# Patient Record
Sex: FEMALE | Race: ASIAN | NOT HISPANIC OR LATINO
[De-identification: names, ages, dates, MRNs, and addresses within clinical notes are randomized per-mention and may not be internally consistent; named-entity substitution may affect disease eponyms.]

---

## 2021-01-05 PROBLEM — Z00.00 ENCOUNTER FOR PREVENTIVE HEALTH EXAMINATION: Status: ACTIVE | Noted: 2021-01-05

## 2021-01-19 ENCOUNTER — APPOINTMENT (OUTPATIENT)
Dept: OTOLARYNGOLOGY | Facility: CLINIC | Age: 36
End: 2021-01-19
Payer: COMMERCIAL

## 2021-01-19 VITALS
WEIGHT: 120 LBS | DIASTOLIC BLOOD PRESSURE: 78 MMHG | BODY MASS INDEX: 21.26 KG/M2 | HEART RATE: 80 BPM | SYSTOLIC BLOOD PRESSURE: 117 MMHG | HEIGHT: 63 IN | OXYGEN SATURATION: 100 %

## 2021-01-19 DIAGNOSIS — Z78.9 OTHER SPECIFIED HEALTH STATUS: ICD-10-CM

## 2021-01-19 PROCEDURE — 99072 ADDL SUPL MATRL&STAF TM PHE: CPT

## 2021-01-19 PROCEDURE — 31575 DIAGNOSTIC LARYNGOSCOPY: CPT

## 2021-01-19 PROCEDURE — 99204 OFFICE O/P NEW MOD 45 MIN: CPT | Mod: 25

## 2021-01-19 NOTE — HISTORY OF PRESENT ILLNESS
Detail Level: Zone
[de-identified] : This is a patient referred by ENT Dr. Jazz Long for thyroid cancer. Nodules were found  2 years ago during physical exam/US by her PCP. US 11/29/20 showed interval enlargement of L-side nodule now measuring 0.9cm \par FNA L thyrid nodule 12/20/20 was positive for malignancy- papillary thyroid carcinoma. \par FNA L Level II lymph node showed no evidence of metastatic malignancy. \par \par No dysphagia, dysphonia or dyspnea. No throat/neck pain, fever, chills, weight loss, weakness, palpitations. \par Patient denies h/o radiation and family h/o thyroid cancer is unknown. \par

## 2021-01-19 NOTE — CONSULT LETTER
[Dear  ___] : Dear  [unfilled], [Consult Letter:] : I had the pleasure of evaluating your patient, [unfilled]. [Please see my note below.] : Please see my note below. [Consult Closing:] : Thank you very much for allowing me to participate in the care of this patient.  If you have any questions, please do not hesitate to contact me. [Sincerely,] : Sincerely, [FreeTextEntry2] : Dr Guillermo Long [FreeTextEntry3] : \par Ap Montiel MD, FACS\par \par Otolaryngology-Head and Neck Surgery\par [employer] [facility]\par

## 2021-01-28 ENCOUNTER — RESULT REVIEW (OUTPATIENT)
Age: 36
End: 2021-01-28

## 2021-03-10 ENCOUNTER — OUTPATIENT (OUTPATIENT)
Dept: OUTPATIENT SERVICES | Facility: HOSPITAL | Age: 36
LOS: 1 days | End: 2021-03-10

## 2021-03-10 VITALS
HEIGHT: 63 IN | SYSTOLIC BLOOD PRESSURE: 100 MMHG | HEART RATE: 70 BPM | TEMPERATURE: 98 F | OXYGEN SATURATION: 99 % | DIASTOLIC BLOOD PRESSURE: 59 MMHG | WEIGHT: 117.95 LBS

## 2021-03-10 DIAGNOSIS — C73 MALIGNANT NEOPLASM OF THYROID GLAND: ICD-10-CM

## 2021-03-10 LAB
ALBUMIN SERPL ELPH-MCNC: 4.4 G/DL — SIGNIFICANT CHANGE UP (ref 3.3–5)
ALP SERPL-CCNC: 49 U/L — SIGNIFICANT CHANGE UP (ref 40–120)
ALT FLD-CCNC: 12 U/L — SIGNIFICANT CHANGE UP (ref 4–33)
ANION GAP SERPL CALC-SCNC: 12 MMOL/L — SIGNIFICANT CHANGE UP (ref 7–14)
AST SERPL-CCNC: 16 U/L — SIGNIFICANT CHANGE UP (ref 4–32)
BILIRUB SERPL-MCNC: 0.2 MG/DL — SIGNIFICANT CHANGE UP (ref 0.2–1.2)
BUN SERPL-MCNC: 14 MG/DL — SIGNIFICANT CHANGE UP (ref 7–23)
CALCIUM SERPL-MCNC: 9.4 MG/DL — SIGNIFICANT CHANGE UP (ref 8.4–10.5)
CHLORIDE SERPL-SCNC: 102 MMOL/L — SIGNIFICANT CHANGE UP (ref 98–107)
CO2 SERPL-SCNC: 25 MMOL/L — SIGNIFICANT CHANGE UP (ref 22–31)
CREAT SERPL-MCNC: 0.51 MG/DL — SIGNIFICANT CHANGE UP (ref 0.5–1.3)
GLUCOSE SERPL-MCNC: 105 MG/DL — HIGH (ref 70–99)
HCG UR QL: NEGATIVE — SIGNIFICANT CHANGE UP
HCT VFR BLD CALC: 35.2 % — SIGNIFICANT CHANGE UP (ref 34.5–45)
HGB BLD-MCNC: 11.5 G/DL — SIGNIFICANT CHANGE UP (ref 11.5–15.5)
MCHC RBC-ENTMCNC: 28 PG — SIGNIFICANT CHANGE UP (ref 27–34)
MCHC RBC-ENTMCNC: 32.7 GM/DL — SIGNIFICANT CHANGE UP (ref 32–36)
MCV RBC AUTO: 85.6 FL — SIGNIFICANT CHANGE UP (ref 80–100)
NRBC # BLD: 0 /100 WBCS — SIGNIFICANT CHANGE UP
NRBC # FLD: 0 K/UL — SIGNIFICANT CHANGE UP
PLATELET # BLD AUTO: 180 K/UL — SIGNIFICANT CHANGE UP (ref 150–400)
POTASSIUM SERPL-MCNC: 3.7 MMOL/L — SIGNIFICANT CHANGE UP (ref 3.5–5.3)
POTASSIUM SERPL-SCNC: 3.7 MMOL/L — SIGNIFICANT CHANGE UP (ref 3.5–5.3)
PROT SERPL-MCNC: 7.2 G/DL — SIGNIFICANT CHANGE UP (ref 6–8.3)
RBC # BLD: 4.11 M/UL — SIGNIFICANT CHANGE UP (ref 3.8–5.2)
RBC # FLD: 12 % — SIGNIFICANT CHANGE UP (ref 10.3–14.5)
SODIUM SERPL-SCNC: 139 MMOL/L — SIGNIFICANT CHANGE UP (ref 135–145)
WBC # BLD: 4.95 K/UL — SIGNIFICANT CHANGE UP (ref 3.8–10.5)
WBC # FLD AUTO: 4.95 K/UL — SIGNIFICANT CHANGE UP (ref 3.8–10.5)

## 2021-03-10 RX ORDER — SODIUM CHLORIDE 9 MG/ML
1000 INJECTION, SOLUTION INTRAVENOUS
Refills: 0 | Status: DISCONTINUED | OUTPATIENT
Start: 2021-03-17 | End: 2021-03-31

## 2021-03-10 RX ORDER — SODIUM CHLORIDE 9 MG/ML
3 INJECTION INTRAMUSCULAR; INTRAVENOUS; SUBCUTANEOUS EVERY 8 HOURS
Refills: 0 | Status: DISCONTINUED | OUTPATIENT
Start: 2021-03-17 | End: 2021-03-31

## 2021-03-10 NOTE — H&P PST ADULT - NSANTHOSAYNRD_GEN_A_CORE
No. ZENA screening performed.  STOP BANG Legend: 0-2 = LOW Risk; 3-4 = INTERMEDIATE Risk; 5-8 = HIGH Risk

## 2021-03-10 NOTE — H&P PST ADULT - NEGATIVE OPHTHALMOLOGIC SYMPTOMS
no diplopia/no lacrimation L/no lacrimation R/no blurred vision L/no blurred vision R/no discharge L/no discharge R/no pain L/no irritation L/no irritation R/no loss of vision L/no loss of vision R/no scleral injection L/no scleral injection R no diplopia/no photophobia/no lacrimation L/no lacrimation R/no blurred vision L/no blurred vision R/no discharge L/no discharge R/no pain L/no pain R/no irritation L/no irritation R/no loss of vision L/no loss of vision R/no scleral injection L/no scleral injection R

## 2021-03-10 NOTE — H&P PST ADULT - NEGATIVE ENMT SYMPTOMS
no hearing difficulty/no ear pain/no tinnitus/no vertigo/no nasal congestion/no nasal discharge/no nasal obstruction/no post-nasal discharge/no nose bleeds/no abnormal taste sensation no hearing difficulty/no ear pain/no tinnitus/no vertigo/no sinus symptoms/no nasal congestion/no nasal discharge/no nasal obstruction/no post-nasal discharge/no nose bleeds/no abnormal taste sensation

## 2021-03-10 NOTE — H&P PST ADULT - HISTORY OF PRESENT ILLNESS
34 y/o female presents to PST  left thyroid nodule dx in 2018. S/P US of thyroid on 12/2020 showed that nodule got bigger and irregular. S/P thyroid biopsy 12/20/2020. Pre op diagnosis 36 y/o female presents to PST for pre op evaluation with hx of left thyroid nodule dx in 2018. S/P US of thyroid on 12/2020 which showed that nodule got bigger and irregular. S/P thyroid biopsy 12/20/2020. Pre op diagnosis: malignant neoplasm of thyroid gland. Scheduled for left thyroid lobectomy on 03/17/2021

## 2021-03-10 NOTE — H&P PST ADULT - NSICDXPROBLEM_GEN_ALL_CORE_FT
PROBLEM DIAGNOSES  Problem: Malignant neoplasm of thyroid gland  Assessment and Plan: Scheduled for left thyroid lobectomy on 03/17/2021. Pre op instructions, famotidine, chlorhexidine gluconate soap given and explained. Pt verbalized understanding.  Pt confirmed scheduled appt. for Covid test pre op  Pt instructed to bring urine specimen on day of surgery, urine cup given to pt who verbalized understanding.

## 2021-03-11 ENCOUNTER — TRANSCRIPTION ENCOUNTER (OUTPATIENT)
Age: 36
End: 2021-03-11

## 2021-03-12 DIAGNOSIS — Z01.818 ENCOUNTER FOR OTHER PREPROCEDURAL EXAMINATION: ICD-10-CM

## 2021-03-14 ENCOUNTER — APPOINTMENT (OUTPATIENT)
Dept: DISASTER EMERGENCY | Facility: CLINIC | Age: 36
End: 2021-03-14

## 2021-03-15 LAB — SARS-COV-2 N GENE NPH QL NAA+PROBE: NOT DETECTED

## 2021-03-16 ENCOUNTER — TRANSCRIPTION ENCOUNTER (OUTPATIENT)
Age: 36
End: 2021-03-16

## 2021-03-17 ENCOUNTER — OUTPATIENT (OUTPATIENT)
Dept: OUTPATIENT SERVICES | Facility: HOSPITAL | Age: 36
LOS: 1 days | Discharge: HOME | End: 2021-03-17
Payer: MEDICAID

## 2021-03-17 ENCOUNTER — RESULT REVIEW (OUTPATIENT)
Age: 36
End: 2021-03-17

## 2021-03-17 ENCOUNTER — APPOINTMENT (OUTPATIENT)
Dept: OTOLARYNGOLOGY | Facility: HOSPITAL | Age: 36
End: 2021-03-17

## 2021-03-17 VITALS
SYSTOLIC BLOOD PRESSURE: 105 MMHG | DIASTOLIC BLOOD PRESSURE: 68 MMHG | OXYGEN SATURATION: 97 % | RESPIRATION RATE: 13 BRPM | HEART RATE: 82 BPM

## 2021-03-17 VITALS
TEMPERATURE: 99 F | RESPIRATION RATE: 14 BRPM | DIASTOLIC BLOOD PRESSURE: 61 MMHG | SYSTOLIC BLOOD PRESSURE: 99 MMHG | WEIGHT: 117.95 LBS | HEIGHT: 63 IN | OXYGEN SATURATION: 98 % | HEART RATE: 83 BPM

## 2021-03-17 DIAGNOSIS — C73 MALIGNANT NEOPLASM OF THYROID GLAND: ICD-10-CM

## 2021-03-17 LAB — HCG UR QL: NEGATIVE — SIGNIFICANT CHANGE UP

## 2021-03-17 PROCEDURE — 60220 PARTIAL REMOVAL OF THYROID: CPT

## 2021-03-17 PROCEDURE — 88307 TISSUE EXAM BY PATHOLOGIST: CPT | Mod: 26

## 2021-03-17 PROCEDURE — 88305 TISSUE EXAM BY PATHOLOGIST: CPT | Mod: 26

## 2021-03-17 PROCEDURE — 88331 PATH CONSLTJ SURG 1 BLK 1SPC: CPT | Mod: 26

## 2021-03-17 RX ORDER — HYDROMORPHONE HYDROCHLORIDE 2 MG/ML
0.5 INJECTION INTRAMUSCULAR; INTRAVENOUS; SUBCUTANEOUS
Refills: 0 | Status: DISCONTINUED | OUTPATIENT
Start: 2021-03-17 | End: 2021-03-17

## 2021-03-17 RX ORDER — FENTANYL CITRATE 50 UG/ML
25 INJECTION INTRAVENOUS
Refills: 0 | Status: DISCONTINUED | OUTPATIENT
Start: 2021-03-17 | End: 2021-03-17

## 2021-03-17 RX ORDER — ONDANSETRON 8 MG/1
4 TABLET, FILM COATED ORAL ONCE
Refills: 0 | Status: DISCONTINUED | OUTPATIENT
Start: 2021-03-17 | End: 2021-03-31

## 2021-03-17 RX ORDER — CHOLECALCIFEROL (VITAMIN D3) 125 MCG
0 CAPSULE ORAL
Qty: 0 | Refills: 0 | DISCHARGE

## 2021-03-17 RX ORDER — FOLIC ACID/VIT B COMPLEX AND C 400 MCG
0 TABLET ORAL
Qty: 0 | Refills: 0 | DISCHARGE

## 2021-03-17 NOTE — ASU DISCHARGE PLAN (ADULT/PEDIATRIC) - CALL YOUR DOCTOR IF YOU HAVE ANY OF THE FOLLOWING:
Bleeding that does not stop/Swelling that gets worse/Pain not relieved by Medications/Inability to tolerate liquids or foods Bleeding that does not stop/Swelling that gets worse/Pain not relieved by Medications/Fever greater than (need to indicate Fahrenheit or Celsius)/Wound/Surgical Site with redness, or foul smelling discharge or pus/Nausea and vomiting that does not stop/Unable to urinate/Inability to tolerate liquids or foods

## 2021-03-17 NOTE — ASU DISCHARGE PLAN (ADULT/PEDIATRIC) - CARE PROVIDER_API CALL
Ap Montiel)  Otolaryngology  [address] [place] [address]  Phone: [phone]  Fax: [phone]  Follow Up Time:

## 2021-03-18 PROBLEM — C73 MALIGNANT NEOPLASM OF THYROID GLAND: Chronic | Status: ACTIVE | Noted: 2021-03-10

## 2021-03-19 LAB — SURGICAL PATHOLOGY STUDY: SIGNIFICANT CHANGE UP

## 2021-04-01 ENCOUNTER — APPOINTMENT (OUTPATIENT)
Dept: OTOLARYNGOLOGY | Facility: CLINIC | Age: 36
End: 2021-04-01
Payer: COMMERCIAL

## 2021-04-01 VITALS
BODY MASS INDEX: 21.26 KG/M2 | HEIGHT: 63 IN | DIASTOLIC BLOOD PRESSURE: 76 MMHG | HEART RATE: 102 BPM | SYSTOLIC BLOOD PRESSURE: 119 MMHG | WEIGHT: 120 LBS

## 2021-04-01 PROCEDURE — 99024 POSTOP FOLLOW-UP VISIT: CPT

## 2021-04-01 NOTE — REASON FOR VISIT
[Post-Operative Visit] : a post-operative visit [FreeTextEntry2] : Left thyroid lobectomy and isthmusectomy.\par Left thyroid lobectomy and isthmusectomy.\par Left thyroid lobectomy and isthmusectomy.\par Left thyroid lobectomy and isthmusectomy.\par Left thyroid lobectomy and isthmusectomy.\par Left thyroid lobectomy and isthmusectomy.\par s/p left thyroid lobectomy and isthmusectomy 3/17/2021

## 2021-04-01 NOTE — HISTORY OF PRESENT ILLNESS
[de-identified] : This is a patient here for f/up s/p left thyroid lobectomy and isthmusectomy 3/17/2021. \par Pt was referred by ENT Dr. Jazz Long for thyroid cancer.  US 11/29/20 showed interval enlargement of L-side nodule now measuring 0.9cm  FNA L thyroid nodule 12/20/20 was positive for malignancy- papillary thyroid carcinoma. \par FNA L Level II lymph node showed no evidence of metastatic malignancy. \par Today pt denies throat/neck pain, dysphagia, dyspnea, dysphonia. No fever, chills. Eating and drinking without issues\par \par surgical path 3/17/2021: Final Diagnosis\par \par 1. Left paratracheal tissue r/o lymph node, excision\par - 1 lymph nodes negative for metastatic carcinoma\par \par 2. Thyroid, left lobe, lobectomy\par - Papillary thyroid microcarcinoma, see synoptic summary\par - 3 out of 5 lymph nodes positive for metastatic carcinoma\par - Lymphocytic thyroiditis with Hurthle cell change\par \par 3. Lymph nodes, left paratracheal, dissection\par - 3 lymph nodes negative for metastatic carcinoma

## 2021-04-01 NOTE — PHYSICAL EXAM
[Midline] : trachea located in midline position [Normal] : no rashes [de-identified] : Incision C/D/I.

## 2021-04-01 NOTE — CONSULT LETTER
[Dear  ___] : Dear  [unfilled], [Courtesy Letter:] : I had the pleasure of seeing your patient, [unfilled], in my office today. [Please see my note below.] : Please see my note below. [Consult Closing:] : Thank you very much for allowing me to participate in the care of this patient.  If you have any questions, please do not hesitate to contact me. [Sincerely,] : Sincerely, [FreeTextEntry2] : Dr Guillermo Long [FreeTextEntry3] : \par Ap Montiel MD, FACS\par \par Otolaryngology-Head and Neck Surgery\par [employer] [facility]\par

## 2021-04-06 ENCOUNTER — APPOINTMENT (OUTPATIENT)
Dept: ENDOCRINOLOGY | Facility: CLINIC | Age: 36
End: 2021-04-06
Payer: COMMERCIAL

## 2021-04-06 PROCEDURE — 99205 OFFICE O/P NEW HI 60 MIN: CPT | Mod: 95

## 2021-04-08 NOTE — ASSESSMENT
[FreeTextEntry1] : This is a 35-year-old female with papillary thyroid carcinoma.  She underwent thyroid ultrasound on November 29, 2020 and was found to have interval enlargement of left sided thyroid nodule measuring 0.9 cm with ill-defined borders.\par FNA on December 20, 2020 showed papillary thyroid carcinoma ([place] category VI).  Left level IIA 2.4 cm lymph node showed no evidence of malignancy.\par Internal review of slides confirmed diagnosis.\par She underwent left thyroid lobectomy on March 17, 2021.  Pathology showed left papillary thyroid microcarcinoma 1 x 0.6 x 0.5 cm, 3 out of 9 lymph nodes positive for metastatic carcinoma, lymphocytic thyroiditis.  Largest lymph node deposit 1 mm.  Margins uninvolved by carcinoma, no angioinvasion, no lymphatic invasion, no perineural invasion, no extrathyroidal extension.\par [id number]\par Stage I, low risk.  Will monitor with thyroid ultrasound and thyroglobulin levels once she undergoes completion thyroidectomy.\par She is planning on undergoing completion thyroidectomy sometime in May per patient.\par Check TFTs.\par Check 25 vitamin D.\par

## 2021-04-08 NOTE — REASON FOR VISIT
[Home] : at home, [unfilled] , at the time of the visit. [Medical Office: ([facility])___] : at the medical office located in  [Verbal consent obtained from patient] : the patient, [unfilled] [Consultation] : a consultation visit [Thyroid Cancer] : thyroid cancer [FreeTextEntry2] : Dr. Montiel

## 2021-04-08 NOTE — CONSULT LETTER
[Dear  ___] : Dear  [unfilled], [Consult Letter:] : I had the pleasure of evaluating your patient, [unfilled]. [Please see my note below.] : Please see my note below. [Consult Closing:] : Thank you very much for allowing me to participate in the care of this patient.  If you have any questions, please do not hesitate to contact me. [Sincerely,] : Sincerely, [FreeTextEntry3] : Magaly Alejo MD, FACE\par

## 2021-04-17 ENCOUNTER — NON-APPOINTMENT (OUTPATIENT)
Age: 36
End: 2021-04-17

## 2021-04-17 LAB
25(OH)D3 SERPL-MCNC: 20.9 NG/ML
T4 FREE SERPL-MCNC: 0.9 NG/DL
TSH SERPL-ACNC: 7.85 UIU/ML

## 2021-04-18 LAB
THYROGLOB AB SERPL-ACNC: 566 IU/ML
THYROPEROXIDASE AB SERPL IA-ACNC: 31 IU/ML

## 2021-05-06 ENCOUNTER — APPOINTMENT (OUTPATIENT)
Dept: OTOLARYNGOLOGY | Facility: CLINIC | Age: 36
End: 2021-05-06
Payer: COMMERCIAL

## 2021-05-06 VITALS
SYSTOLIC BLOOD PRESSURE: 96 MMHG | HEIGHT: 63 IN | DIASTOLIC BLOOD PRESSURE: 59 MMHG | HEART RATE: 80 BPM | BODY MASS INDEX: 21.26 KG/M2 | WEIGHT: 120 LBS

## 2021-05-06 PROCEDURE — 99024 POSTOP FOLLOW-UP VISIT: CPT

## 2021-05-06 RX ORDER — VITAMIN E ACID SUCCINATE 268 MG
TABLET ORAL
Refills: 0 | Status: COMPLETED | COMMUNITY
End: 2021-05-06

## 2021-05-06 RX ORDER — CHROMIUM 200 MCG
TABLET ORAL
Refills: 0 | Status: COMPLETED | COMMUNITY
End: 2021-05-06

## 2021-05-06 RX ORDER — ASCORBIC ACID 500 MG
TABLET ORAL
Refills: 0 | Status: COMPLETED | COMMUNITY
End: 2021-05-06

## 2021-05-06 RX ORDER — VITAMIN A 10000 UNIT
TABLET ORAL
Refills: 0 | Status: COMPLETED | COMMUNITY
End: 2021-05-06

## 2021-05-06 NOTE — CONSULT LETTER
[Dear  ___] : Dear  [unfilled], [Courtesy Letter:] : I had the pleasure of seeing your patient, [unfilled], in my office today. [Please see my note below.] : Please see my note below. [Consult Closing:] : Thank you very much for allowing me to participate in the care of this patient.  If you have any questions, please do not hesitate to contact me. [Sincerely,] : Sincerely, [FreeTextEntry2] : Dr Guillermo Long [FreeTextEntry3] : \par Ap Montiel MD, FACS\par \par Otolaryngology-Head and Neck Surgery\par [employer] [facility]\par

## 2021-05-06 NOTE — PHYSICAL EXAM
[Midline] : trachea located in midline position [Normal] : no rashes [de-identified] : Incision C/D/I.

## 2021-05-06 NOTE — HISTORY OF PRESENT ILLNESS
[de-identified] : This is a patient here for f/up s/p left thyroid lobectomy and isthmusectomy 3/17/2021. \par Pt was referred by ENT Dr. Jazz Long for thyroid cancer.  US 11/29/20 showed interval enlargement of L-side nodule measuring 0.9cm  FNA L thyroid nodule 12/20/20 was positive for malignancy- papillary thyroid carcinoma. \par FNA L Level II lymph node showed no evidence of metastatic malignancy.  Followed by Dr. Danielle, last labs 4/16/21. \par Pt c/o slight tightness in neck area, otherwise she feels well.  Pt denies dysphonia, dysphagia, pain, SOB, otalgia.\par \par \par \par surgical path 3/17/2021: Final Diagnosis\par \par 1. Left paratracheal tissue r/o lymph node, excision\par - 1 lymph nodes negative for metastatic carcinoma\par \par 2. Thyroid, left lobe, lobectomy\par - Papillary thyroid microcarcinoma, see synoptic summary\par - 3 out of 5 lymph nodes positive for metastatic carcinoma\par - Lymphocytic thyroiditis with Hurthle cell change\par \par 3. Lymph nodes, left paratracheal, dissection\par - 3 lymph nodes negative for metastatic carcinoma

## 2021-07-12 ENCOUNTER — RX RENEWAL (OUTPATIENT)
Age: 36
End: 2021-07-12

## 2021-09-01 ENCOUNTER — APPOINTMENT (OUTPATIENT)
Dept: ULTRASOUND IMAGING | Facility: CLINIC | Age: 36
End: 2021-09-01
Payer: COMMERCIAL

## 2021-09-01 ENCOUNTER — RESULT REVIEW (OUTPATIENT)
Age: 36
End: 2021-09-01

## 2021-09-01 ENCOUNTER — OUTPATIENT (OUTPATIENT)
Dept: OUTPATIENT SERVICES | Facility: HOSPITAL | Age: 36
LOS: 1 days | End: 2021-09-01
Payer: COMMERCIAL

## 2021-09-01 DIAGNOSIS — C73 MALIGNANT NEOPLASM OF THYROID GLAND: ICD-10-CM

## 2021-09-01 PROCEDURE — 76536 US EXAM OF HEAD AND NECK: CPT

## 2021-09-01 PROCEDURE — 76536 US EXAM OF HEAD AND NECK: CPT | Mod: 26

## 2021-09-16 ENCOUNTER — APPOINTMENT (OUTPATIENT)
Dept: OTOLARYNGOLOGY | Facility: CLINIC | Age: 36
End: 2021-09-16
Payer: COMMERCIAL

## 2021-09-16 VITALS
WEIGHT: 120 LBS | BODY MASS INDEX: 21.26 KG/M2 | HEART RATE: 69 BPM | DIASTOLIC BLOOD PRESSURE: 64 MMHG | HEIGHT: 63 IN | SYSTOLIC BLOOD PRESSURE: 98 MMHG

## 2021-09-16 PROCEDURE — 99214 OFFICE O/P EST MOD 30 MIN: CPT

## 2021-09-16 PROCEDURE — 99072 ADDL SUPL MATRL&STAF TM PHE: CPT

## 2021-09-16 NOTE — HISTORY OF PRESENT ILLNESS
[de-identified] : 35 year old female  f/up s/p left thyroid lobectomy and isthmusectomy 3/17/2021. \par Ultrasound 09/01/2021 showed Status post left hemithyroidectomy, otherwise unremarkable thyroid ultrasound.\par Reports itchiness at incision site. Denies dysphagia, odynophagia, aspirations, dyspnea, dysphonia, SOB, otalgia.\par Complete review of systems which was performed during a previous encounter was reviewed with the patient and there are no changes except as stated in the HPI section.\par \par \par

## 2021-09-16 NOTE — CONSULT LETTER
[Dear  ___] : Dear  [unfilled], [Courtesy Letter:] : I had the pleasure of seeing your patient, [unfilled], in my office today. [Please see my note below.] : Please see my note below. [Consult Closing:] : Thank you very much for allowing me to participate in the care of this patient.  If you have any questions, please do not hesitate to contact me. [Sincerely,] : Sincerely, [FreeTextEntry2] : Dr Guillermo Long  [FreeTextEntry3] : \par Ap Montiel MD, FACS\par \par Otolaryngology-Head and Neck Surgery\par [employer] at [facility]\par

## 2021-09-16 NOTE — PHYSICAL EXAM
[Midline] : trachea located in midline position [Normal] : no rashes [de-identified] : Incision C/D/I.

## 2021-09-17 ENCOUNTER — APPOINTMENT (OUTPATIENT)
Dept: ENDOCRINOLOGY | Facility: CLINIC | Age: 36
End: 2021-09-17
Payer: COMMERCIAL

## 2021-09-17 PROCEDURE — 99212 OFFICE O/P EST SF 10 MIN: CPT | Mod: 95

## 2021-09-17 NOTE — REASON FOR VISIT
[Home] : at home, [unfilled] , at the time of the visit. [Medical Office: ([facility])___] : at the medical office located in  [Verbal consent obtained from patient] : the patient, [unfilled] [Follow - Up] : a follow-up visit [Hypothyroidism] : hypothyroidism [Thyroid Cancer] : thyroid cancer

## 2021-09-17 NOTE — HISTORY OF PRESENT ILLNESS
[FreeTextEntry1] : CC: Thyroid cancer\par This is a 35-year-old female with papillary thyroid carcinoma, hypothyroidism, vitamin D insufficiency.\par She underwent thyroid ultrasound on November 29, 2020 and was found to have interval enlargement of left sided thyroid nodule measuring 0.9 cm with ill-defined borders.\par FNA on December 20, 2020 showed papillary thyroid carcinoma ([place] category VI).  Left level IIA 2.4 cm lymph node showed no evidence of malignancy.\par Internal review of slides confirmed diagnosis.\par She underwent left thyroid lobectomy on March 17, 2021.  Pathology showed left papillary thyroid microcarcinoma 1 x 0.6 x 0.5 cm, 3 out of 9 lymph nodes positive for metastatic carcinoma, lymphocytic thyroiditis.  Largest lymph node deposit 1 mm.  Margins uninvolved by carcinoma, no angioinvasion, no lymphatic invasion, no perineural invasion, no extrathyroidal extension.\par [id number]\par Stage I, low risk\par She is currently on levothyroxine 25 mcg daily.  She takes levothyroxine in the morning on empty stomach.\par She takes ergocalciferol 50,000 IU weekly.\par She reports menstrual cycle approximately 40 days.

## 2021-09-17 NOTE — ASSESSMENT
[FreeTextEntry1] : This is a 35-year-old female with papillary thyroid carcinoma, hypothyroidism, vitamin D insufficiency. \par She underwent thyroid ultrasound on November 29, 2020 and was found to have interval enlargement of left sided thyroid nodule measuring 0.9 cm with ill-defined borders.\par FNA on December 20, 2020 showed papillary thyroid carcinoma ([place] category VI).  Left level IIA 2.4 cm lymph node showed no evidence of malignancy.\par Internal review of slides confirmed diagnosis.\par She underwent left thyroid lobectomy on March 17, 2021.  Pathology showed left papillary thyroid microcarcinoma 1 x 0.6 x 0.5 cm, 3 out of 9 lymph nodes positive for metastatic carcinoma, lymphocytic thyroiditis.  Largest lymph node deposit 1 mm.  Margins uninvolved by carcinoma, no angioinvasion, no lymphatic invasion, no perineural invasion, no extrathyroidal extension.\par [id number]\par Stage I, low risk.  Will monitor with thyroid ultrasounds.  Recent thyroid ultrasound from September 2021 showed status post left hemithyroidectomy, otherwise unremarkable thyroid ultrasound.\par Check TFTs.\par Check 25 vitamin D.\par Check prolactin level as she reports irregular menses.\par Further management pending above results.

## 2021-09-22 LAB
25(OH)D3 SERPL-MCNC: 30.8 NG/ML
PROLACTIN SERPL-MCNC: 17.9 NG/ML
T4 FREE SERPL-MCNC: 1 NG/DL
TSH SERPL-ACNC: 8.06 UIU/ML

## 2021-10-28 ENCOUNTER — APPOINTMENT (OUTPATIENT)
Dept: ENDOCRINOLOGY | Facility: CLINIC | Age: 36
End: 2021-10-28
Payer: COMMERCIAL

## 2021-10-28 DIAGNOSIS — E55.9 VITAMIN D DEFICIENCY, UNSPECIFIED: ICD-10-CM

## 2021-10-28 PROCEDURE — 99212 OFFICE O/P EST SF 10 MIN: CPT | Mod: 95

## 2021-11-02 PROBLEM — E55.9 VITAMIN D INSUFFICIENCY: Status: ACTIVE | Noted: 2021-04-20

## 2021-11-02 LAB
T4 FREE SERPL-MCNC: 1.2 NG/DL
TSH SERPL-ACNC: 7.62 UIU/ML

## 2021-11-02 RX ORDER — ERGOCALCIFEROL 1.25 MG/1
1.25 MG CAPSULE ORAL
Qty: 12 | Refills: 0 | Status: COMPLETED | COMMUNITY
Start: 2021-04-20 | End: 2021-11-02

## 2021-11-02 NOTE — HISTORY OF PRESENT ILLNESS
[FreeTextEntry1] : CC: Thyroid cancer\par This is a 35-year-old female with papillary thyroid carcinoma, hypothyroidism, vitamin D insufficiency.\par She underwent thyroid ultrasound on November 29, 2020 and was found to have interval enlargement of left sided thyroid nodule measuring 0.9 cm with ill-defined borders.\par FNA on December 20, 2020 showed papillary thyroid carcinoma ([place] category VI).  Left level IIA 2.4 cm lymph node showed no evidence of malignancy.\par Internal review of slides confirmed diagnosis.\par She underwent left thyroid lobectomy on March 17, 2021.  Pathology showed left papillary thyroid microcarcinoma 1 x 0.6 x 0.5 cm, 3 out of 9 lymph nodes positive for metastatic carcinoma, lymphocytic thyroiditis.  Largest lymph node deposit 1 mm.  Margins uninvolved by carcinoma, no angioinvasion, no lymphatic invasion, no perineural invasion, no extrathyroidal extension.\par [id number]\par Stage I, low risk\par She is currently on levothyroxine 50 mcg daily.  She takes levothyroxine in the morning on empty stomach.\par She takes ergocalciferol 50,000 IU weekly.

## 2021-11-02 NOTE — ASSESSMENT
[FreeTextEntry1] : This is a 35-year-old female with papillary thyroid carcinoma, hypothyroidism, vitamin D insufficiency. \par She underwent thyroid ultrasound on November 29, 2020 and was found to have interval enlargement of left sided thyroid nodule measuring 0.9 cm with ill-defined borders.\par FNA on December 20, 2020 showed papillary thyroid carcinoma ([place] category VI).  Left level IIA 2.4 cm lymph node showed no evidence of malignancy.\par Internal review of slides confirmed diagnosis.\par She underwent left thyroid lobectomy on March 17, 2021.  Pathology showed left papillary thyroid microcarcinoma 1 x 0.6 x 0.5 cm, 3 out of 9 lymph nodes positive for metastatic carcinoma, lymphocytic thyroiditis.  Largest lymph node deposit 1 mm.  Margins uninvolved by carcinoma, no angioinvasion, no lymphatic invasion, no perineural invasion, no extrathyroidal extension.\par [id number]\par Stage I, low risk.  Will monitor with thyroid ultrasounds.  Recent thyroid ultrasound from September 2021 showed status post left hemithyroidectomy, otherwise unremarkable thyroid ultrasound.\par Check TFTs.\par She will complete ergocalciferol and then start vitamin D 1000 IU daily.

## 2021-12-07 ENCOUNTER — APPOINTMENT (OUTPATIENT)
Dept: ENDOCRINOLOGY | Facility: CLINIC | Age: 36
End: 2021-12-07
Payer: COMMERCIAL

## 2021-12-07 PROCEDURE — 99212 OFFICE O/P EST SF 10 MIN: CPT | Mod: 95

## 2021-12-08 NOTE — ASSESSMENT
[FreeTextEntry1] : This is a 35-year-old female with papillary thyroid carcinoma, hypothyroidism, vitamin D insufficiency. \par She underwent thyroid ultrasound on November 29, 2020 and was found to have interval enlargement of left sided thyroid nodule measuring 0.9 cm with ill-defined borders.\par FNA on December 20, 2020 showed papillary thyroid carcinoma ([place] category VI).  Left level IIA 2.4 cm lymph node showed no evidence of malignancy.\par Internal review of slides confirmed diagnosis.\par She underwent left thyroid lobectomy on March 17, 2021.  Pathology showed left papillary thyroid microcarcinoma 1 x 0.6 x 0.5 cm, 3 out of 9 lymph nodes positive for metastatic carcinoma, lymphocytic thyroiditis.  Largest lymph node deposit 1 mm.  Margins uninvolved by carcinoma, no angioinvasion, no lymphatic invasion, no perineural invasion, no extrathyroidal extension.\par [id number]\par Stage I, low risk.  Will monitor with thyroid ultrasounds.  Recent thyroid ultrasound from September 2021 showed status post left hemithyroidectomy, otherwise unremarkable thyroid ultrasound.\par Check TFTs.

## 2021-12-08 NOTE — HISTORY OF PRESENT ILLNESS
[FreeTextEntry1] : CC: Thyroid cancer\par This is a 35-year-old female with papillary thyroid carcinoma, hypothyroidism, vitamin D insufficiency.\par She underwent thyroid ultrasound on November 29, 2020 and was found to have interval enlargement of left sided thyroid nodule measuring 0.9 cm with ill-defined borders.\par FNA on December 20, 2020 showed papillary thyroid carcinoma ([place] category VI).  Left level IIA 2.4 cm lymph node showed no evidence of malignancy.\par Internal review of slides confirmed diagnosis.\par She underwent left thyroid lobectomy on March 17, 2021.  Pathology showed left papillary thyroid microcarcinoma 1 x 0.6 x 0.5 cm, 3 out of 9 lymph nodes positive for metastatic carcinoma, lymphocytic thyroiditis.  Largest lymph node deposit 1 mm.  Margins uninvolved by carcinoma, no angioinvasion, no lymphatic invasion, no perineural invasion, no extrathyroidal extension.\par [id number]\par Stage I, low risk\par She is currently on levothyroxine 75 mcg daily.  She takes levothyroxine in the morning on empty stomach.\par

## 2021-12-08 NOTE — REASON FOR VISIT
[Home] : at home, [unfilled] , at the time of the visit. [Medical Office: ([facility])___] : at the medical office located in  [Verbal consent obtained from patient] : the patient, [unfilled] [Follow - Up] : a follow-up visit [Thyroid Cancer] : thyroid cancer

## 2021-12-14 LAB
T4 FREE SERPL-MCNC: 1.3 NG/DL
TSH SERPL-ACNC: 2.48 UIU/ML

## 2022-01-27 LAB
T4 FREE SERPL-MCNC: 1.7 NG/DL
TSH SERPL-ACNC: 1.59 UIU/ML

## 2022-02-14 ENCOUNTER — OUTPATIENT (OUTPATIENT)
Dept: OUTPATIENT SERVICES | Facility: HOSPITAL | Age: 37
LOS: 1 days | End: 2022-02-14
Payer: COMMERCIAL

## 2022-02-14 ENCOUNTER — APPOINTMENT (OUTPATIENT)
Dept: ULTRASOUND IMAGING | Facility: CLINIC | Age: 37
End: 2022-02-14
Payer: COMMERCIAL

## 2022-02-14 DIAGNOSIS — C73 MALIGNANT NEOPLASM OF THYROID GLAND: ICD-10-CM

## 2022-02-14 PROCEDURE — 76536 US EXAM OF HEAD AND NECK: CPT

## 2022-02-14 PROCEDURE — 76536 US EXAM OF HEAD AND NECK: CPT | Mod: 26

## 2022-02-17 ENCOUNTER — NON-APPOINTMENT (OUTPATIENT)
Age: 37
End: 2022-02-17

## 2022-03-08 ENCOUNTER — APPOINTMENT (OUTPATIENT)
Dept: OTOLARYNGOLOGY | Facility: CLINIC | Age: 37
End: 2022-03-08
Payer: COMMERCIAL

## 2022-03-08 VITALS — TEMPERATURE: 97.2 F

## 2022-03-08 VITALS
DIASTOLIC BLOOD PRESSURE: 70 MMHG | HEIGHT: 63 IN | SYSTOLIC BLOOD PRESSURE: 102 MMHG | WEIGHT: 119 LBS | BODY MASS INDEX: 21.09 KG/M2 | HEART RATE: 66 BPM

## 2022-03-08 PROCEDURE — 99213 OFFICE O/P EST LOW 20 MIN: CPT

## 2022-03-08 PROCEDURE — 99072 ADDL SUPL MATRL&STAF TM PHE: CPT

## 2022-03-08 RX ORDER — ERGOCALCIFEROL 1.25 MG/1
1.25 MG CAPSULE, LIQUID FILLED ORAL
Qty: 4 | Refills: 2 | Status: COMPLETED | COMMUNITY
Start: 2021-07-12 | End: 2022-03-08

## 2022-03-08 NOTE — PHYSICAL EXAM
[Midline] : trachea located in midline position [Normal] : no rashes [de-identified] : Incision well healed.

## 2022-03-08 NOTE — REASON FOR VISIT
[Subsequent Evaluation] : a subsequent evaluation for [FreeTextEntry2] : Papillary carcinoma of thyroid

## 2022-03-08 NOTE — CONSULT LETTER
[Dear  ___] : Dear  [unfilled], [Courtesy Letter:] : I had the pleasure of seeing your patient, [unfilled], in my office today. [Please see my note below.] : Please see my note below. [Consult Closing:] : Thank you very much for allowing me to participate in the care of this patient.  If you have any questions, please do not hesitate to contact me. [Sincerely,] : Sincerely, [FreeTextEntry2] : Dr Guillermo Long  [FreeTextEntry3] : \par Ap Montiel MD, FACS\par \par Otolaryngology-Head and Neck Surgery\par [employer] [facility]\par

## 2022-03-08 NOTE — HISTORY OF PRESENT ILLNESS
[de-identified] : 36 year old female here for 6 month follow-up of thyroid cancer. S/p left thyroid lobectomy and isthmusectomy 3/17/2021. \par Taking Levothyroxine 100mcg. \par Today pt reports occasional itchiness and pain at incision site. Denies dysphagia, odynophagia, dyspnea, or dysphonia. No fever, chills, weight loss. Eating and drinking without issues. \par Pt reports lab work was done with PCP on 3/5/2022. \par US from February 2022:\par IMPRESSION:\par \par Unremarkable soft tissue neck sonogram status post left thyroidectomy\par \par Complete review of systems which was performed during a previous encounter was reviewed with the patient and there are no changes except as stated in the HPI section.\par \par \par \par \par

## 2022-03-25 LAB
T4 FREE SERPL-MCNC: 2.4 NG/DL
TSH SERPL-ACNC: 0.03 UIU/ML

## 2022-03-26 LAB
T3FREE SERPL-MCNC: 4.74 PG/ML
T4 FREE SERPL-MCNC: 2.3 NG/DL
THYROGLOB AB SERPL-ACNC: 231 IU/ML
THYROGLOB SERPL-MCNC: 1.25 NG/ML
TSH SERPL-ACNC: 0.03 UIU/ML

## 2022-04-01 ENCOUNTER — TRANSCRIPTION ENCOUNTER (OUTPATIENT)
Age: 37
End: 2022-04-01

## 2022-06-28 ENCOUNTER — NON-APPOINTMENT (OUTPATIENT)
Age: 37
End: 2022-06-28

## 2022-06-28 LAB
T4 FREE SERPL-MCNC: 1.3 NG/DL
THYROGLOB AB SERPL-ACNC: 160 IU/ML
THYROGLOB SERPL-MCNC: <0.2 NG/ML
TSH SERPL-ACNC: 2.42 UIU/ML

## 2022-08-19 LAB
T4 FREE SERPL-MCNC: 1.4 NG/DL
TSH SERPL-ACNC: 0.43 UIU/ML

## 2022-08-22 ENCOUNTER — NON-APPOINTMENT (OUTPATIENT)
Age: 37
End: 2022-08-22

## 2022-08-31 ENCOUNTER — APPOINTMENT (OUTPATIENT)
Dept: ULTRASOUND IMAGING | Facility: CLINIC | Age: 37
End: 2022-08-31

## 2022-08-31 ENCOUNTER — OUTPATIENT (OUTPATIENT)
Dept: OUTPATIENT SERVICES | Facility: HOSPITAL | Age: 37
LOS: 1 days | End: 2022-08-31
Payer: COMMERCIAL

## 2022-08-31 DIAGNOSIS — C73 MALIGNANT NEOPLASM OF THYROID GLAND: ICD-10-CM

## 2022-08-31 PROCEDURE — 76536 US EXAM OF HEAD AND NECK: CPT

## 2022-08-31 PROCEDURE — 76536 US EXAM OF HEAD AND NECK: CPT | Mod: 26

## 2022-09-13 ENCOUNTER — APPOINTMENT (OUTPATIENT)
Dept: OTOLARYNGOLOGY | Facility: CLINIC | Age: 37
End: 2022-09-13

## 2022-09-13 VITALS
HEART RATE: 85 BPM | OXYGEN SATURATION: 97 % | WEIGHT: 120 LBS | BODY MASS INDEX: 21.26 KG/M2 | HEIGHT: 63 IN | DIASTOLIC BLOOD PRESSURE: 70 MMHG | SYSTOLIC BLOOD PRESSURE: 103 MMHG

## 2022-09-13 VITALS — TEMPERATURE: 98 F

## 2022-09-13 PROCEDURE — 99214 OFFICE O/P EST MOD 30 MIN: CPT

## 2022-09-13 NOTE — HISTORY OF PRESENT ILLNESS
[de-identified] : 36 year old female here for 6 month follow-up of thyroid cancer. S/p left thyroid lobectomy and isthmusectomy 3/17/2021. Taking Levothyroxine 100mcg. \par Today pt reports occasional itchiness at incision site. Rarely feels some pinching sensation in the area.  Denies dysphagia, odynophagia, dyspnea, or dysphonia. No fever, chills, weight loss. Eating and drinking without issues. \par TSH and Free T4 on 8/17/2022 wnl . \par \par US from 8/31/2022:\par FINDINGS:\par Right Lobe: 4.1 cm x  1.2 cm x 1.0 cm. Normal echogenicity without focal lesion.\par Left Lobe post thyroidectomy bed intact\par Cervical Lymph Nodes: No enlarged or abnormal morphology cervical nodes.\par IMPRESSION: Stable soft tissue neck ultrasound\par

## 2022-09-13 NOTE — CONSULT LETTER
[Dear  ___] : Dear  [unfilled], [Courtesy Letter:] : I had the pleasure of seeing your patient, [unfilled], in my office today. [Please see my note below.] : Please see my note below. [Consult Closing:] : Thank you very much for allowing me to participate in the care of this patient.  If you have any questions, please do not hesitate to contact me. [Sincerely,] : Sincerely, [FreeTextEntry2] : Dr Guillermo Long  [FreeTextEntry3] : \par Ap Montiel MD, FACS\par \par Otolaryngology-Head and Neck Surgery\par [employer] [facility]\par  No

## 2022-09-13 NOTE — PHYSICAL EXAM
[de-identified] : Incision well healed. [Midline] : trachea located in midline position [Normal] : no rashes

## 2022-09-22 ENCOUNTER — APPOINTMENT (OUTPATIENT)
Dept: ENDOCRINOLOGY | Facility: CLINIC | Age: 37
End: 2022-09-22

## 2022-09-22 VITALS
SYSTOLIC BLOOD PRESSURE: 102 MMHG | OXYGEN SATURATION: 97 % | HEART RATE: 79 BPM | HEIGHT: 63 IN | DIASTOLIC BLOOD PRESSURE: 69 MMHG | WEIGHT: 123 LBS | BODY MASS INDEX: 21.79 KG/M2

## 2022-09-22 PROCEDURE — 36415 COLL VENOUS BLD VENIPUNCTURE: CPT

## 2022-09-22 PROCEDURE — 99214 OFFICE O/P EST MOD 30 MIN: CPT | Mod: 25

## 2022-09-23 LAB
T4 FREE SERPL-MCNC: 1.7 NG/DL
TSH SERPL-ACNC: 0.38 UIU/ML

## 2022-09-23 NOTE — ASSESSMENT
[FreeTextEntry1] : This is a 36-year-old female with papillary thyroid carcinoma, hypothyroidism, vitamin D insufficiency, positive thyroglobulin antibodies, here for follow-up.. \par She underwent thyroid ultrasound on November 29, 2020 and was found to have interval enlargement of left sided thyroid nodule measuring 0.9 cm with ill-defined borders.\par FNA on December 20, 2020 showed papillary thyroid carcinoma ([place] category VI).  Left level IIA 2.4 cm lymph node showed no evidence of malignancy.\par Internal review of slides confirmed diagnosis.\par She underwent left thyroid lobectomy on March 17, 2021.  Pathology showed left papillary thyroid microcarcinoma 1 x 0.6 x 0.5 cm, 3 out of 9 lymph nodes positive for metastatic carcinoma, lymphocytic thyroiditis.  Largest lymph node deposit 1 mm.  Margins uninvolved by carcinoma, no angioinvasion, no lymphatic invasion, no perineural invasion, no extrathyroidal extension.\par [id number]\par Stage I, low risk.  Will monitor with thyroid ultrasound and TFTs/thyroglobulin level.  Recent thyroid ultrasound from August 2022 showed status post left hemithyroidectomy, no enlarged lymph nodes.  \par She is currently on levothyroxine 100 mcg daily.

## 2022-09-23 NOTE — PHYSICAL EXAM
[Alert] : alert [Well Nourished] : well nourished [Healthy Appearance] : healthy appearance [No Acute Distress] : no acute distress [Well Developed] : well developed [Normal Voice/Communication] : normal voice communication [Normal Sclera/Conjunctiva] : normal sclera/conjunctiva [No Proptosis] : no proptosis [No Neck Mass] : no neck mass was observed [No LAD] : no lymphadenopathy [Supple] : the neck was supple [No Respiratory Distress] : no respiratory distress [Normal Affect] : the affect was normal [Normal Insight/Judgement] : insight and judgment were intact [Normal Mood] : the mood was normal [de-identified] : Raised scar

## 2022-09-23 NOTE — HISTORY OF PRESENT ILLNESS
[FreeTextEntry1] : CC: Thyroid cancer\par This is a 36-year-old female with papillary thyroid carcinoma, hypothyroidism, vitamin D insufficiency, positive thyroglobulin antibodies, here for follow-up..\par She underwent thyroid ultrasound on November 29, 2020 and was found to have interval enlargement of left sided thyroid nodule measuring 0.9 cm with ill-defined borders.\par FNA on December 20, 2020 showed papillary thyroid carcinoma (Plessis category VI).  Left level IIA 2.4 cm lymph node showed no evidence of malignancy.\par Internal review of slides confirmed diagnosis.\par She underwent left thyroid lobectomy on March 17, 2021.  Pathology showed left papillary thyroid microcarcinoma 1 x 0.6 x 0.5 cm, 3 out of 9 lymph nodes positive for metastatic carcinoma, lymphocytic thyroiditis.  Largest lymph node deposit 1 mm.  Margins uninvolved by carcinoma, no angioinvasion, no lymphatic invasion, no perineural invasion, no extrathyroidal extension.\par [id number]\par Stage I, low risk\par She is currently on levothyroxine 100 mcg daily.  She takes levothyroxine in the morning on empty stomach.\par

## 2022-09-26 LAB
THYROGLOB AB SERPL-ACNC: 139 IU/ML
THYROGLOB SERPL-MCNC: <0.2 NG/ML

## 2022-09-29 ENCOUNTER — NON-APPOINTMENT (OUTPATIENT)
Age: 37
End: 2022-09-29

## 2022-11-14 ENCOUNTER — RX RENEWAL (OUTPATIENT)
Age: 37
End: 2022-11-14

## 2022-11-17 ENCOUNTER — APPOINTMENT (OUTPATIENT)
Dept: OTOLARYNGOLOGY | Facility: CLINIC | Age: 37
End: 2022-11-17

## 2022-11-17 PROCEDURE — 99213 OFFICE O/P EST LOW 20 MIN: CPT

## 2022-11-17 NOTE — HISTORY OF PRESENT ILLNESS
[de-identified] : 36 year old female here for  follow-up of thyroid cancer. S/p left thyroid lobectomy and isthmusectomy 3/17/2021. Taking Levothyroxine 100mcg. Here today for kenalog injection. \par Today pt states the scar rarely feels itchy. Denies neck pain, dysphagia, odynophagia, dyspnea, or dysphonia. No fever, chills, weight loss. Eating and drinking without issues. \par TSH and Free T4 on 8/17/2022 wnl . \par Last US 8/2022\par

## 2022-11-17 NOTE — PHYSICAL EXAM
[de-identified] : Incision well healed. [Midline] : trachea located in midline position [Normal] : no rashes

## 2022-12-08 ENCOUNTER — APPOINTMENT (OUTPATIENT)
Dept: OTOLARYNGOLOGY | Facility: CLINIC | Age: 37
End: 2022-12-08

## 2022-12-08 VITALS
SYSTOLIC BLOOD PRESSURE: 101 MMHG | HEART RATE: 69 BPM | DIASTOLIC BLOOD PRESSURE: 68 MMHG | HEIGHT: 63 IN | WEIGHT: 120 LBS | BODY MASS INDEX: 21.26 KG/M2

## 2022-12-08 PROCEDURE — 99213 OFFICE O/P EST LOW 20 MIN: CPT

## 2022-12-08 NOTE — REASON FOR VISIT
[Subsequent Evaluation] : a subsequent evaluation for [FreeTextEntry2] : Papillary carcinoma of thyroid/ kenalog injection

## 2022-12-08 NOTE — HISTORY OF PRESENT ILLNESS
[de-identified] : 36 year old female here for  follow-up of thyroid cancer. S/p left thyroid lobectomy and isthmusectomy 3/17/2021. Taking Levothyroxine 100mcg. First kenalog injection 11/17/2022. Here today for second kenalog injection. \par Today pt states the scar seems the same after the injection. Still red and thick. \par  Denies neck pain, dysphagia, odynophagia, dyspnea, or dysphonia. No fever, chills, weight loss. Eating and drinking without issues. \par TSH and Free T4 on 8/17/2022 wnl . \par Last US 8/2022\par

## 2022-12-13 NOTE — REASON FOR VISIT
- Has bilateral neck gland swelling with tenderness; consulted ENT  - No surgical intervention indicated   - Adenopathy likely reactive in nature   - Recommend further workup if it does not resolve within next 2 weeks   - 11/30-- Adenopathy is still present especially right submandibular region  - ENT follow up as OP   [Subsequent Evaluation] : a subsequent evaluation for [FreeTextEntry2] : Papillary carcinoma of thyroid

## 2023-01-05 NOTE — ASU PATIENT PROFILE, ADULT - TEACHING/LEARNING FACTORS IMPACT ABILITY TO LEARN
Have reviewed with the patient her lipid profile which does show upward trends in both total cholesterol and LDL  I have reviewed dietary adjustments with the patient and recommended the initiation of Crestor 5 mg on Monday Wednesday and Friday  We reviewed potential side effects of the medication with the patient as well  We will recheck her lipid profile in 4 months    Baseline liver function studies are all normal  none

## 2023-01-19 ENCOUNTER — APPOINTMENT (OUTPATIENT)
Dept: OTOLARYNGOLOGY | Facility: CLINIC | Age: 38
End: 2023-01-19
Payer: COMMERCIAL

## 2023-01-19 VITALS
HEART RATE: 61 BPM | HEIGHT: 63 IN | OXYGEN SATURATION: 100 % | BODY MASS INDEX: 21.26 KG/M2 | WEIGHT: 120 LBS | DIASTOLIC BLOOD PRESSURE: 67 MMHG | SYSTOLIC BLOOD PRESSURE: 106 MMHG

## 2023-01-19 PROCEDURE — 99213 OFFICE O/P EST LOW 20 MIN: CPT

## 2023-01-19 NOTE — PHYSICAL EXAM
[Midline] : trachea located in midline position [Normal] : no rashes [de-identified] : Incision well healed.

## 2023-01-19 NOTE — HISTORY OF PRESENT ILLNESS
[de-identified] : 37 year old female here for  follow-up of thyroid cancer. S/p left thyroid lobectomy and isthmusectomy 3/17/2021. Taking Levothyroxine 100mcg. Kenalog injections on 11/17/2022 and 12/8/2022. \par Today pt states the scar a little better after the injections.  Still red. \par  Denies neck pain, dysphagia, odynophagia, dyspnea, or dysphonia. No fever, chills, weight loss. Eating and drinking without issues. \par TSH and Free T4 on 9/22/2022 wnl . \par Last Ultrasound 8/2022\par

## 2023-04-20 ENCOUNTER — APPOINTMENT (OUTPATIENT)
Dept: OTOLARYNGOLOGY | Facility: CLINIC | Age: 38
End: 2023-04-20

## 2023-04-20 VITALS
SYSTOLIC BLOOD PRESSURE: 102 MMHG | WEIGHT: 120 LBS | HEART RATE: 66 BPM | HEIGHT: 63 IN | BODY MASS INDEX: 21.26 KG/M2 | OXYGEN SATURATION: 97 % | DIASTOLIC BLOOD PRESSURE: 68 MMHG

## 2023-04-20 NOTE — HISTORY OF PRESENT ILLNESS
[de-identified] : 37 year old woman, 3 month follow up for micropapillary thyroid carcinoma of Left thyroid\par s/p Left thyroid lobectomy 3/17/21 - prominent lymph nodes - frozen section performed on 4 LNs - benign\par Pathology showed 3 out of 9 LNs positive with largest deposit 1mm\par Discussed possible need for complete for thyroidectomy\par s/p US showing NO signs of recurrence in thyroid bed or neck nodes - no contralateral nodes\par Following Dr. Alejo - Tg levels?\par \par Kenalog injection x2 to hypertrophic scar\par Taking 100mcg of Levothyroxine

## 2023-06-20 NOTE — H&P PST ADULT - HEMATOLOGY/LYMPHATICS
Post-Operative Instructions    Carpal Tunnel Release:    Keep hand elevated with fingers above eye-level to control swelling. Keep hand and bandage clean and dry. Do not change or unwrap bandage. Please leave bandage in place until your follow-up appointment. Maintain finger motion by fully straightening and fully bending fingers and thumb at least once an hour (while awake). This may cause some discomfort, but will not damage surgery. You may use your operated hand for lightweight tasks (e.g. writing, eating, dressing, etc.). NO LIFTING, CARRYING OR HEAVY USE. Most Patients do not have \"Serious Pain\" after this procedure and thus most patients do not require prescription pain medication. You may take over the counter medication (Tylenol, Advil, Aleve, etc.) as needed. If you are unable to tolerate the discomfort after your surgery and the OTC medications do not provide some relief, you may contact our office to discuss other options. .    Please call the office at [phone]  in 24 - 48 hours to schedule a follow up appointment for approximately 7 - 10 days after surgery. Please call the office at [phone]  if you have any questions or problems. Kathe Young MD    ANESTHESIA DISCHARGE INSTRUCTIONS    You are under the influence of drugs- do not drink alcohol, drive a car, operate machinery(such as power tools, kitchen appliances, etc), sign legal documents, or make any important decisions for 24 hours (or while on pain medications). Children should not ride bikes or [place] or play on gym sets  for 24 hours after surgery. A responsible adult should be with you for 24 hours. Rest at home today- increase activity as tolerated. Progress slowly to a regular diet unless your physician has instructed you otherwise. Drink plenty of water. CALL YOUR DOCTOR IF YOU:  Have moderate to severe nausea or vomiting AND are unable to hold down fluids or prescribed medications.   Have negative

## 2023-08-07 ENCOUNTER — RX RENEWAL (OUTPATIENT)
Age: 38
End: 2023-08-07

## 2023-08-14 ENCOUNTER — RX RENEWAL (OUTPATIENT)
Age: 38
End: 2023-08-14

## 2023-08-14 RX ORDER — LEVOTHYROXINE SODIUM 0.1 MG/1
100 TABLET ORAL
Qty: 45 | Refills: 0 | Status: ACTIVE | COMMUNITY
Start: 2021-04-20 | End: 1900-01-01

## 2023-08-28 ENCOUNTER — APPOINTMENT (OUTPATIENT)
Dept: ULTRASOUND IMAGING | Facility: CLINIC | Age: 38
End: 2023-08-28
Payer: COMMERCIAL

## 2023-08-28 PROCEDURE — 76536 US EXAM OF HEAD AND NECK: CPT

## 2023-08-31 ENCOUNTER — APPOINTMENT (OUTPATIENT)
Dept: OTOLARYNGOLOGY | Facility: CLINIC | Age: 38
End: 2023-08-31
Payer: COMMERCIAL

## 2023-08-31 VITALS
DIASTOLIC BLOOD PRESSURE: 66 MMHG | HEART RATE: 53 BPM | RESPIRATION RATE: 17 BRPM | WEIGHT: 120 LBS | BODY MASS INDEX: 21.26 KG/M2 | HEIGHT: 63 IN | OXYGEN SATURATION: 98 % | TEMPERATURE: 98.6 F | SYSTOLIC BLOOD PRESSURE: 100 MMHG

## 2023-08-31 PROCEDURE — 99214 OFFICE O/P EST MOD 30 MIN: CPT

## 2023-08-31 NOTE — CONSULT LETTER
[Dear  ___] : Dear  [unfilled], [Courtesy Letter:] : I had the pleasure of seeing your patient, [unfilled], in my office today. [Please see my note below.] : Please see my note below. [Consult Closing:] : Thank you very much for allowing me to participate in the care of this patient.  If you have any questions, please do not hesitate to contact me. [Sincerely,] : Sincerely, [FreeTextEntry2] : Brenda Mock MD [FreeTextEntry3] : Ap Montiel MD, FACS   Otolaryngology-Head and Neck Surgery  [place] [facility] [facility]

## 2023-08-31 NOTE — HISTORY OF PRESENT ILLNESS
[de-identified] : 37 year old female here for  follow-up of thyroid cancer. S/p left thyroid lobectomy and isthmusectomy 3/17/2021. Taking Levothyroxine 100mcg. Kenalog injections on 11/17/2022 and 12/8/2022.  Today pt states the scar a little better after the injections. States the site is still red with occasional itch- No pain, No burning, No swelling, No drainage.  Denies neck pain, dysphagia, odynophagia, dyspnea, or dysphonia. No fever, chills, weight loss.  Eating and drinking without issues.  TSH and Free T4 on 9/22/2022 wnl .  Ultrasound Thyroid 08/28/23  IMPRESSION:  Normal left thyroid bed. Normal right thyroid lobe.

## 2023-08-31 NOTE — PHYSICAL EXAM
[Midline] : trachea located in midline position [Normal] : no rashes [de-identified] : Incision well healed.

## 2023-09-12 LAB
T3FREE SERPL-MCNC: 2.76 PG/ML
T4 FREE SERPL-MCNC: 1.6 NG/DL
THYROGLOB AB SERPL-ACNC: 199 IU/ML
THYROGLOB SERPL-MCNC: 0.35 NG/ML
TSH SERPL-ACNC: 0.88 UIU/ML

## 2023-09-26 ENCOUNTER — APPOINTMENT (OUTPATIENT)
Dept: ENDOCRINOLOGY | Facility: CLINIC | Age: 38
End: 2023-09-26

## 2024-02-29 ENCOUNTER — APPOINTMENT (OUTPATIENT)
Dept: OTOLARYNGOLOGY | Facility: CLINIC | Age: 39
End: 2024-02-29
Payer: MEDICAID

## 2024-02-29 VITALS
DIASTOLIC BLOOD PRESSURE: 55 MMHG | WEIGHT: 120 LBS | BODY MASS INDEX: 21.26 KG/M2 | HEIGHT: 63 IN | SYSTOLIC BLOOD PRESSURE: 100 MMHG

## 2024-02-29 PROCEDURE — 99213 OFFICE O/P EST LOW 20 MIN: CPT

## 2024-02-29 NOTE — HISTORY OF PRESENT ILLNESS
[de-identified] : 38 year old female here for follow-up of thyroid cancer.  S/p left thyroid lobectomy and isthmusectomy 3/17/2021.  Taking Levothyroxine 100mcg. Kenalog injections on 11/17/2022 and 12/8/2022.  States the site is still red with occasional itch- No pain, No burning, No swelling, No drainage.  Denies neck pain, dysphagia, odynophagia, dyspnea, or dysphonia. No fever, chills, weight loss.  Eating and drinking without issues.   TSH 9/11/23 0.88 T3 9/11/23 2.76 T4 9/11/23 1.6  Ultrasound Thyroid 08/28/23  IMPRESSION:  Normal left thyroid bed. Normal right thyroid lobe.

## 2024-02-29 NOTE — PHYSICAL EXAM
[Midline] : trachea located in midline position [Normal] : no rashes [de-identified] : Incision well healed.

## 2024-02-29 NOTE — CONSULT LETTER
[Dear  ___] : Dear  [unfilled], [Courtesy Letter:] : I had the pleasure of seeing your patient, [unfilled], in my office today. [Please see my note below.] : Please see my note below. [Consult Closing:] : Thank you very much for allowing me to participate in the care of this patient.  If you have any questions, please do not hesitate to contact me. [Sincerely,] : Sincerely, [FreeTextEntry2] : Brenda Mock MD [FreeTextEntry3] : Ap Montiel MD, FACS   Otolaryngology-Head and Neck Surgery  [place] [facility] [employer] at [facility]

## 2024-03-05 LAB
T3FREE SERPL-MCNC: 2.7 PG/ML
T4 FREE SERPL-MCNC: 1.4 NG/DL
THYROGLOB AB SERPL-ACNC: 433 IU/ML
THYROGLOB SERPL-MCNC: 0.98 NG/ML
TSH SERPL-ACNC: 3.92 UIU/ML

## 2024-03-11 ENCOUNTER — OUTPATIENT (OUTPATIENT)
Dept: OUTPATIENT SERVICES | Facility: HOSPITAL | Age: 39
LOS: 1 days | End: 2024-03-11
Payer: COMMERCIAL

## 2024-03-11 ENCOUNTER — APPOINTMENT (OUTPATIENT)
Dept: ULTRASOUND IMAGING | Facility: CLINIC | Age: 39
End: 2024-03-11
Payer: MEDICAID

## 2024-03-11 DIAGNOSIS — C73 MALIGNANT NEOPLASM OF THYROID GLAND: ICD-10-CM

## 2024-03-11 PROCEDURE — 76536 US EXAM OF HEAD AND NECK: CPT | Mod: 26

## 2024-03-11 PROCEDURE — 76536 US EXAM OF HEAD AND NECK: CPT

## 2024-05-01 ENCOUNTER — APPOINTMENT (OUTPATIENT)
Dept: ENDOCRINOLOGY | Facility: CLINIC | Age: 39
End: 2024-05-01
Payer: MEDICAID

## 2024-05-01 VITALS
BODY MASS INDEX: 21.62 KG/M2 | DIASTOLIC BLOOD PRESSURE: 60 MMHG | HEIGHT: 63 IN | OXYGEN SATURATION: 98 % | WEIGHT: 122 LBS | SYSTOLIC BLOOD PRESSURE: 98 MMHG | HEART RATE: 88 BPM

## 2024-05-01 DIAGNOSIS — R76.8 OTHER SPECIFIED ABNORMAL IMMUNOLOGICAL FINDINGS IN SERUM: ICD-10-CM

## 2024-05-01 DIAGNOSIS — C73 MALIGNANT NEOPLASM OF THYROID GLAND: ICD-10-CM

## 2024-05-01 DIAGNOSIS — E03.9 HYPOTHYROIDISM, UNSPECIFIED: ICD-10-CM

## 2024-05-01 PROCEDURE — G2211 COMPLEX E/M VISIT ADD ON: CPT | Mod: NC,1L

## 2024-05-01 PROCEDURE — 99205 OFFICE O/P NEW HI 60 MIN: CPT

## 2024-05-01 PROCEDURE — 99215 OFFICE O/P EST HI 40 MIN: CPT

## 2024-05-01 NOTE — ASSESSMENT
[FreeTextEntry1] : 1. Thyroid Cancer 2. Postsurgical Hypothyroidism  Surgery Left hemithyroidectomy 3/17/2021, Dr. Ap Montiel Pathology: Unifocal left 1 cm classic PTC, margins negative, no angiolymphatic invasion, no perineural invasion, no ETE. 3/9 central neck LN positive for carcinoma, largest 0.1 cm, no MARIA L. 2015 IRAJ Risk: Low risk AJCC 8th edition TNM Stage: [id number] WHITNEY Treatment: None  Surveillance: Multiple neck ultrasounds with ALEXANDER, most recent 3/11/2024. Low-level thyroglobulin after hemithyroidectomy, with positive thyroglobulin antibodies.  PLAN: -Continue levothyroxine 100mcg daily. Repeat TSH and FT4 for dose adjustment.  Maintain TSH goal 0.5-2 for IRAJ low risk thyroid cancer status post hemithyroidectomy. -repeat Tg and Tg antibodies today including with mass spec. Tg and Tg antibodies are not the most reliable in this patient who has Hashimoto's thyroiditis, and history of hemithyroidectomy.  We discussed that thyroglobulin antibodies can be variable, and we can continue to monitor them in the absence of structural disease identified on neck ultrasounds. -Repeat neck ultrasound in 1 year, around 3/2025.   Case discussed with Dr. Chelsea Farrell MD Endocrinology [place]  Taylor Tran MD [facility] End[facility]ology at [address], [address] Ph: [phone] Fax: [phone]

## 2024-05-01 NOTE — PHYSICAL EXAM
[Alert] : alert [Well Nourished] : well nourished [Healthy Appearance] : healthy appearance [No Acute Distress] : no acute distress [Normal Voice/Communication] : normal voice communication [Normal Sclera/Conjunctiva] : normal sclera/conjunctiva [Normal Outer Ear/Nose] : the ears and nose were normal in appearance [No Neck Mass] : no neck mass was observed [No LAD] : no lymphadenopathy [Thyroid Not Enlarged] : the thyroid was not enlarged [No Thyroid Nodules] : no palpable thyroid nodules [Well Healed Scar] : well healed scar [No Respiratory Distress] : no respiratory distress [No Accessory Muscle Use] : no accessory muscle use [Normal Rate and Effort] : normal respiratory rate and effort [Normal Rate] : heart rate was normal [Regular Rhythm] : with a regular rhythm [No Edema] : no peripheral edema [Not Tender] : non-tender [Not Distended] : not distended [Soft] : abdomen soft [Normal Anterior Cervical Nodes] : no anterior cervical lymphadenopathy [Normal Posterior Cervical Nodes] : no posterior cervical lymphadenopathy [Spine Straight] : spine straight [No Stigmata of Cushings Syndrome] : no stigmata of Cushings Syndrome [Normal Gait] : normal gait [Oriented x3] : oriented to person, place, and time [Normal Affect] : the affect was normal [Normal Insight/Judgement] : insight and judgment were intact [Normal Mood] : the mood was normal [Acanthosis Nigricans] : no acanthosis nigricans [Hirsutism] : no hirsutism

## 2024-05-01 NOTE — HISTORY OF PRESENT ILLNESS
[FreeTextEntry1] : CHIEF COMPLAINT: Thyroid cancer, postsurgical hypothyroidism REFERRED BY: Dr. Ap Montiel   HISTORY OF PRESENTING ILLNESS: The patient is a 38-year-old female being seen in the office today for evaluation of thyroid cancer, postsurgical hypothyroidism.   Initially discovered nodule in mid- 2010s. Patient reports left sided thyroid nodule grew and last report in 11/2020 showed 0.9cm TR 5 nodule. FNA 2020 [place] 6.    Surgery Left hemithyroidectomy 3/17/2021, Dr. Ap Montiel Pathology: Unifocal left 1 cm classic PTC, margins negative, no angiolymphatic invasion, no perineural invasion, no ETE. 3/9 central neck LN positive for carcinoma, largest 0.1 cm, no MARIA L. 2015 IRAJ Risk: Low risk AJCC 8th edition TNM Stage: [id number] WHITNEY Treatment: None  Surveillance:  Multiple neck ultrasounds with ALEXANDER, most recent 3/11/2024. Low-level thyroglobulin after hemithyroidectomy, with positive thyroglobulin antibodies.    Postsurgical Hypothyroidism: Patient reports that prior to surgery she was not taking levothyroxine and was only started on it post-operatively She is currently taking 100mcg daily of levothyroxine. Reports compliance with medication. She takes a multivitamin 30 minutes after breakfast She is taking it appropriately, on an empty stomach at least 30 minutes before food. Denies any symptoms of hypo or hyperthyroidism at this time. She is having regular menses and has no plans for future pregnancy. She is not using any form of contraception at this time  Last TSH 3.92, though patient notes she had run out of medication for 1 week prior to getting blood work done   Patient has a maternal aunt with thyroid cancer. No personal history of radiation exposure to the head and neck area.

## 2024-05-01 NOTE — REVIEW OF SYSTEMS
[Negative] : Heme/Lymph [All other systems negative] : All other systems negative [Fatigue] : no fatigue [Decreased Appetite] : appetite not decreased [Recent Weight Gain (___ Lbs)] : no recent weight gain [Recent Weight Loss (___ Lbs)] : no recent weight loss [Dysphagia] : no dysphagia [Neck Pain] : no neck pain [Dysphonia] : no dysphonia [Chest Pain] : no chest pain [Slow Heart Rate] : heart rate is not slow [Palpitations] : no palpitations [Fast Heart Rate] : heart rate is not fast [Lower Ext Edema] : no lower extremity edema [Nausea] : no nausea [Constipation] : no constipation [Vomiting] : no vomiting [Diarrhea] : no diarrhea [Headaches] : no headaches [Dizziness] : no dizziness [Tremors] : no tremors

## 2024-05-02 LAB
T4 FREE SERPL-MCNC: 1.6 NG/DL
THYROGLOB AB SERPL-ACNC: 313 IU/ML
THYROGLOB SERPL-MCNC: 0.42 NG/ML
TSH SERPL-ACNC: 3.13 UIU/ML

## 2024-05-07 LAB
CLINICAL BIOCHEMIST REVIEW: NORMAL
THYROGLOB SERPL-MCNC: 0.8 NG/ML

## 2024-08-29 ENCOUNTER — APPOINTMENT (OUTPATIENT)
Dept: OTOLARYNGOLOGY | Facility: CLINIC | Age: 39
End: 2024-08-29

## 2024-09-12 ENCOUNTER — APPOINTMENT (OUTPATIENT)
Dept: OTOLARYNGOLOGY | Facility: CLINIC | Age: 39
End: 2024-09-12
Payer: MEDICAID

## 2024-09-12 VITALS — SYSTOLIC BLOOD PRESSURE: 110 MMHG | HEART RATE: 69 BPM | DIASTOLIC BLOOD PRESSURE: 73 MMHG

## 2024-09-12 DIAGNOSIS — C73 MALIGNANT NEOPLASM OF THYROID GLAND: ICD-10-CM

## 2024-09-12 PROCEDURE — 99212 OFFICE O/P EST SF 10 MIN: CPT

## 2024-09-12 NOTE — PHYSICAL EXAM
[Midline] : trachea located in midline position [Normal] : no rashes [de-identified] : Incision well healed.

## 2024-09-12 NOTE — CONSULT LETTER
[Dear  ___] : Dear  [unfilled], [Courtesy Letter:] : I had the pleasure of seeing your patient, [unfilled], in my office today. [Please see my note below.] : Please see my note below. [Consult Closing:] : Thank you very much for allowing me to participate in the care of this patient.  If you have any questions, please do not hesitate to contact me. [Sincerely,] : Sincerely, [FreeTextEntry2] : Brenda Mock MD [FreeTextEntry3] : Ap Montiel MD, FACS   Otolaryngology-Head and Neck Surgery  [place] [facility] [employer] at [facility]

## 2024-09-12 NOTE — REASON FOR VISIT
[Subsequent Evaluation] : a subsequent evaluation for [FreeTextEntry2] : Papillary carcinoma of thyroid/kenalog injection

## 2024-09-12 NOTE — HISTORY OF PRESENT ILLNESS
[de-identified] : 38 year old female here for follow-up of thyroid cancer.  S/p left thyroid lobectomy and isthmusectomy 3/17/2021.  Following up with Dr. Tran. Continues taking Levothyroxine 100mcg. hx of Kenalog injections on 11/17/2022 and 12/8/2022.  States the site is improving. No concerns with redness or burning at site. No pain, No burning, No swelling, No drainage.  Denies neck pain, dysphagia, odynophagia, dyspnea, or dysphonia. No fever, chills, weight loss.  Eating and drinking without issues.   TSH 5/01/24 3.13 T4 5/01/24 1.6   IMPRESSION: Status post left hemithyroidectomy, otherwise unremarkable thyroid ultrasound.

## 2024-09-23 ENCOUNTER — APPOINTMENT (OUTPATIENT)
Dept: ENDOCRINOLOGY | Facility: CLINIC | Age: 39
End: 2024-09-23
Payer: MEDICAID

## 2024-09-23 VITALS
BODY MASS INDEX: 21.79 KG/M2 | HEART RATE: 73 BPM | HEIGHT: 63 IN | WEIGHT: 123 LBS | DIASTOLIC BLOOD PRESSURE: 70 MMHG | SYSTOLIC BLOOD PRESSURE: 90 MMHG | OXYGEN SATURATION: 99 %

## 2024-09-23 DIAGNOSIS — R76.8 OTHER SPECIFIED ABNORMAL IMMUNOLOGICAL FINDINGS IN SERUM: ICD-10-CM

## 2024-09-23 DIAGNOSIS — E03.9 HYPOTHYROIDISM, UNSPECIFIED: ICD-10-CM

## 2024-09-23 DIAGNOSIS — C73 MALIGNANT NEOPLASM OF THYROID GLAND: ICD-10-CM

## 2024-09-23 PROCEDURE — 99215 OFFICE O/P EST HI 40 MIN: CPT

## 2024-09-23 PROCEDURE — G2211 COMPLEX E/M VISIT ADD ON: CPT | Mod: NC

## 2024-09-23 NOTE — PHYSICAL EXAM
[Alert] : alert [Well Nourished] : well nourished [Healthy Appearance] : healthy appearance [No Acute Distress] : no acute distress [Normal Voice/Communication] : normal voice communication [Normal Sclera/Conjunctiva] : normal sclera/conjunctiva [Normal Outer Ear/Nose] : the ears and nose were normal in appearance [No Neck Mass] : no neck mass was observed [No LAD] : no lymphadenopathy [Thyroid Not Enlarged] : the thyroid was not enlarged [No Thyroid Nodules] : no palpable thyroid nodules [Well Healed Scar] : well healed scar [No Respiratory Distress] : no respiratory distress [No Accessory Muscle Use] : no accessory muscle use [Normal Rate and Effort] : normal respiratory rate and effort [Normal Rate] : heart rate was normal [Regular Rhythm] : with a regular rhythm [No Edema] : no peripheral edema [Not Tender] : non-tender [Not Distended] : not distended [Soft] : abdomen soft [Normal Anterior Cervical Nodes] : no anterior cervical lymphadenopathy [Spine Straight] : spine straight [No Stigmata of Cushings Syndrome] : no stigmata of Cushings Syndrome [Normal Gait] : normal gait [Oriented x3] : oriented to person, place, and time [Normal Affect] : the affect was normal [Normal Insight/Judgement] : insight and judgment were intact [Normal Mood] : the mood was normal [Acanthosis Nigricans] : no acanthosis nigricans [Hirsutism] : no hirsutism

## 2024-09-23 NOTE — ASSESSMENT
[FreeTextEntry1] : 1. Thyroid Cancer 2. Postsurgical Hypothyroidism  Surgery Left hemithyroidectomy 3/17/2021, Dr. Ap Montiel Pathology: Unifocal left 1 cm classic PTC, margins negative, no angiolymphatic invasion, no perineural invasion, no ETE. 3/9 central neck LN positive for carcinoma, largest 0.1 cm, no MARIA L. 2015 IRAJ Risk: Low risk AJCC 8th edition TNM Stage: [id number] WHITNEY Treatment: None  Surveillance: Multiple neck ultrasounds with ALEXANDER, most recent 3/11/2024. Low-level thyroglobulin after hemithyroidectomy, with positive thyroglobulin antibodies.  PLAN: -She has IRAJ low risk thyroid cancer, though with microscopic central neck LN positivity.  She is status post hemithyroidectomy, and is amenable to active surveillance rather than completion thyroidectomy given small volume central neck lymphadenopathy only. -Continue levothyroxine 100mcg 7.5 tablets a week. Repeat TSH and FT4 for dose adjustment.  Maintain TSH goal 0.5-2 for IRAJ low risk thyroid cancer status post hemithyroidectomy. - Tg and Tg antibodies are not the most reliable in this patient who has Hashimoto's thyroiditis, and history of hemithyroidectomy.  We discussed that thyroglobulin antibodies can be variable, and we can continue to monitor them in the absence of structural disease identified on neck ultrasounds. -Repeat neck ultrasound in 6 months - 3/2025 and RTC for follow up after that.   Case discussed with Dr. Chelsea Puckett MD Endocrinology Fellow - PGY-5  Taylor Tran MD [facility] at [address], [address] Ph: [phone] Fax: [phone]

## 2024-09-23 NOTE — HISTORY OF PRESENT ILLNESS
[FreeTextEntry1] : CHIEF COMPLAINT: Thyroid cancer, postsurgical hypothyroidism Surgeon: Dr. Ap Montiel   HISTORY OF PRESENTING ILLNESS: The patient is a 38-year-old female being seen in the office today for evaluation of thyroid cancer, postsurgical hypothyroidism.   Initially discovered nodule in mid- 2010s. Patient reports left sided thyroid nodule grew and last report in 11/2020 showed 0.9cm TR 5 nodule. FNA 2020 [place] 6.    Surgery Left hemithyroidectomy 3/17/2021, Dr. Ap Montiel Pathology: Unifocal left 1 cm classic PTC, margins negative, no angiolymphatic invasion, no perineural invasion, no ETE. 3/9 central neck LN positive for carcinoma, largest 0.1 cm, no MARIA L. 2015 IRAJ Risk: Low risk AJCC 8th edition TNM Stage: [id number] WHITNEY Treatment: None  Surveillance:  Multiple neck ultrasounds with ALEXANDER, most recent 3/11/2024. Low-level thyroglobulin after hemithyroidectomy, with positive thyroglobulin antibodies.  5/2024: TSH 3.13 - increased levothyroxine 100mcg to 7.5x/week. TG stable low, TG Ab persistently positive   Postsurgical Hypothyroidism: Patient reports that prior to surgery she was not taking levothyroxine and was only started on it post-operatively She is currently taking 100mcg daily of levothyroxine 7.5x/week (taking extra half a pill on sundays). Reports compliance with medication- reports missed Sunday dose occasionally in summer d/t being in [place], but has been on the correct regimen for the past month. No vitamins. She is taking it appropriately, on an empty stomach at least 30 minutes before food. Denies any symptoms of hypo or hyperthyroidism at this time. She is having regular menses and has no plans for future pregnancy. She is not using any form of contraception at this time No palpitations or tremors, no lower extremity swelling, no constipation or diarrhea, no neck swelling, normal voice.   Patient has a maternal aunt with thyroid cancer. No personal history of radiation exposure to the head and neck area.

## 2024-09-24 LAB
T4 FREE SERPL-MCNC: 1.7 NG/DL
TSH SERPL-ACNC: 0.45 UIU/ML

## 2025-02-25 ENCOUNTER — OUTPATIENT (OUTPATIENT)
Dept: OUTPATIENT SERVICES | Facility: HOSPITAL | Age: 40
LOS: 1 days | End: 2025-02-25
Payer: COMMERCIAL

## 2025-02-25 ENCOUNTER — APPOINTMENT (OUTPATIENT)
Dept: ULTRASOUND IMAGING | Facility: CLINIC | Age: 40
End: 2025-02-25
Payer: MEDICAID

## 2025-02-25 DIAGNOSIS — C73 MALIGNANT NEOPLASM OF THYROID GLAND: ICD-10-CM

## 2025-02-25 PROCEDURE — 76536 US EXAM OF HEAD AND NECK: CPT

## 2025-02-25 PROCEDURE — 76536 US EXAM OF HEAD AND NECK: CPT | Mod: 26

## 2025-04-04 ENCOUNTER — NON-APPOINTMENT (OUTPATIENT)
Age: 40
End: 2025-04-04

## 2025-04-04 ENCOUNTER — APPOINTMENT (OUTPATIENT)
Dept: ENDOCRINOLOGY | Facility: CLINIC | Age: 40
End: 2025-04-04

## 2025-04-04 VITALS
DIASTOLIC BLOOD PRESSURE: 74 MMHG | SYSTOLIC BLOOD PRESSURE: 116 MMHG | WEIGHT: 123 LBS | HEART RATE: 68 BPM | BODY MASS INDEX: 21.79 KG/M2 | OXYGEN SATURATION: 98 %

## 2025-04-04 DIAGNOSIS — E03.9 HYPOTHYROIDISM, UNSPECIFIED: ICD-10-CM

## 2025-04-04 DIAGNOSIS — C73 MALIGNANT NEOPLASM OF THYROID GLAND: ICD-10-CM

## 2025-04-04 DIAGNOSIS — R76.8 OTHER SPECIFIED ABNORMAL IMMUNOLOGICAL FINDINGS IN SERUM: ICD-10-CM

## 2025-04-04 PROCEDURE — G2211 COMPLEX E/M VISIT ADD ON: CPT | Mod: NC

## 2025-04-04 PROCEDURE — 99214 OFFICE O/P EST MOD 30 MIN: CPT

## 2025-04-05 LAB
T4 FREE SERPL-MCNC: 1.6 NG/DL
THYROGLOB AB SERPL-ACNC: 82.6 IU/ML
THYROGLOB SERPL-MCNC: 0.67 NG/ML
TSH SERPL-ACNC: 0.48 UIU/ML

## 2025-09-11 ENCOUNTER — APPOINTMENT (OUTPATIENT)
Dept: OTOLARYNGOLOGY | Facility: CLINIC | Age: 40
End: 2025-09-11
Payer: MEDICAID

## 2025-09-11 VITALS
DIASTOLIC BLOOD PRESSURE: 75 MMHG | HEIGHT: 63 IN | HEART RATE: 68 BPM | BODY MASS INDEX: 21.79 KG/M2 | OXYGEN SATURATION: 99 % | SYSTOLIC BLOOD PRESSURE: 109 MMHG | WEIGHT: 123 LBS

## 2025-09-11 DIAGNOSIS — C73 MALIGNANT NEOPLASM OF THYROID GLAND: ICD-10-CM

## 2025-09-11 PROCEDURE — 99212 OFFICE O/P EST SF 10 MIN: CPT
